# Patient Record
Sex: FEMALE
[De-identification: names, ages, dates, MRNs, and addresses within clinical notes are randomized per-mention and may not be internally consistent; named-entity substitution may affect disease eponyms.]

---

## 2019-11-18 PROBLEM — Z00.00 ENCOUNTER FOR PREVENTIVE HEALTH EXAMINATION: Status: ACTIVE | Noted: 2019-11-18

## 2022-08-04 ENCOUNTER — NON-APPOINTMENT (OUTPATIENT)
Age: 77
End: 2022-08-04

## 2023-02-10 ENCOUNTER — APPOINTMENT (OUTPATIENT)
Dept: ORTHOPEDIC SURGERY | Facility: CLINIC | Age: 78
End: 2023-02-10
Payer: OTHER MISCELLANEOUS

## 2023-02-10 VITALS — HEIGHT: 61 IN | BODY MASS INDEX: 26.43 KG/M2 | WEIGHT: 140 LBS

## 2023-02-10 DIAGNOSIS — Z78.9 OTHER SPECIFIED HEALTH STATUS: ICD-10-CM

## 2023-02-10 DIAGNOSIS — Z85.118 PERSONAL HISTORY OF OTHER MALIGNANT NEOPLASM OF BRONCHUS AND LUNG: ICD-10-CM

## 2023-02-10 DIAGNOSIS — Z86.79 PERSONAL HISTORY OF OTHER DISEASES OF THE CIRCULATORY SYSTEM: ICD-10-CM

## 2023-02-10 PROCEDURE — 99072 ADDL SUPL MATRL&STAF TM PHE: CPT

## 2023-02-10 PROCEDURE — 73030 X-RAY EXAM OF SHOULDER: CPT | Mod: RT

## 2023-02-10 PROCEDURE — 99203 OFFICE O/P NEW LOW 30 MIN: CPT

## 2023-02-10 RX ORDER — LOSARTAN POTASSIUM 100 MG/1
100 TABLET, FILM COATED ORAL
Refills: 0 | Status: ACTIVE | COMMUNITY

## 2023-02-10 RX ORDER — METOPROLOL SUCCINATE 100 MG/1
100 TABLET, EXTENDED RELEASE ORAL
Refills: 0 | Status: ACTIVE | COMMUNITY

## 2023-02-10 RX ORDER — EZETIMIBE 10 MG/1
10 TABLET ORAL
Refills: 0 | Status: ACTIVE | COMMUNITY

## 2023-02-10 NOTE — WORK
[Torn Ligament/Tendon/Muscle] : torn ligament, tendon or muscle [Was the competent medical cause of the injury] : was the competent medical cause of the injury [Are consistent with the injury] : are consistent with the injury [Consistent with my objective findings] : consistent with my objective findings [Mild Partial] : mild partial [Does not reveal pre-existing condition(s) that may affect treatment/prognosis] : does not reveal pre-existing condition(s) that may affect treatment/prognosis [Can return to work without limitations on ______] : can return to work without limitations on [unfilled] [Patient] : patient [No Rx restrictions] : No Rx restrictions. [I provided the services listed above] :  I provided the services listed above. [FreeTextEntry1] : 75% [Heavy Work:] : Heavy Work: Exerting 50 to 100 pounds of force occasionally, and/or 25 to 50 pounds of force frequently, and/or 10 to 20 pounds of force constantly to move objects. Physical demand requirements are in excess of those for Medium Work

## 2023-02-10 NOTE — DISCUSSION/SUMMARY
[de-identified] : (Assessment)\par \par History, clinical examination and imaging were most consistent with:\par -right rotator cuff tear\par \par The diagnosis was explained in detail. The potential non-surgical and surgical treatments were reviewed. The relative risks and benefits of each option were considered relative to the patient’s age, activity level, medical history, symptom severity and previously attempted treatments. \par \par -Non-surgical treatment was selected. \par -Patient will begin with PT and OTC medication. 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 She defers advanced imaging at this time in favor of conservative treatment.\par -Injection is discussed and deferred at this time.\par -Patient's work status will remain full duty as she can complete her duties with her current shoulder function. \par -If she fails to improve we would consider cortisone injection at her next visit. \par \par (Plan)\par \par -Activity modification\par -Physical therapy: script provided\par -Ibuprofen: twice daily with food as needed for pain, GI precautions discussed\par -Follow up: 6 weeks if failing to improve\par \par \par (MDM) \par \par Problem Complexity\par -Moderate: chronic illness with exacerbation\par \par Risk\par -Low: physical therapy\par -Low: over the counter medication\par \par Patient has not been seen by another provider in my practice within the past 2 years who specializes in orthopedic sports medicine, shoulder or elbow surgery. \par \par The patient was advised to consult with their primary medical provider prior to initiation of any new medications to reduce the risk of adverse effects specific to their long-term home medications and medical history. The risk of gastrointestinal irritation and kidney injury specific to long-term NSAID use was discussed.   \par \par

## 2023-02-10 NOTE — HISTORY OF PRESENT ILLNESS
[de-identified] : (New Visit)\par \par Patient Details\par -Age: 78\par -Occupation:   at [employer]\par -Worker’s compensation claim: yes 12/20/2022\par -No-fault claim: no\par -School injury claim: no  \par \par \par Symptom Details \par -Body part: RT shoulder \par -Duration of symptoms: 12/20/22\par -How symptoms began:  Slipped and fell walking into the kitchen at work, landing on her RT shoulder. she has continued to work but wanted an evaluation. worse with lifting. patient denies shoulder pain prior to the injury\par -Location of pain: lateral\par -Quality of pain: dull\par -Pain score at rest: 2\par -Pain score with activity: 5\par -Swelling: no\par -Stiffness: no\par -Radicular symptoms (numbness or radiating pain down extremity): no\par \par \par Treatment Details\par -Activity modification: lifting less\par -Medication: advil prn\par -Physical therapy: no \par -Home exercise program: no \par -Injection: no\par -MRI: no\par \par

## 2023-02-10 NOTE — IMAGING
[de-identified] : (Shoulder Examination)\par \par Laterality\par -Right\par \par General\par -In no acute distress: yes\par -Alert and oriented to person, place and time: yes\par -Lymphadenopathy: no\par -Peripheral edema: no\par -Sensation grossly intact to light touch: yes\par -Capillary refill less than 2 seconds: yes \par \par Inspection\par -Skin intact: yes\par -Swelling: no\par -Atrophy: no\par -Scapular winging: no\par -AC deformity: no\par -Biceps deformity: no\par \par Tenderness to Palpation\par -Bicipital groove: no \par -AC joint: no\par -Scapulothoracic: no\par -Cervical paraspinal: no\par \par Range of Motion\par -Active forward elevation: 150\par -Passive forward elevation: 170\par -External rotation at the side: 70\par -Internal rotation behind the back: T7 \par -Cervical spine: normal\par \par Strength \par -Forward elevation: 5-\par -External rotation at the side: 5-\par -Internal rotation at the side: 5\par -Deltoid: 5\par \par Special Tests\par -Burger: positive \par -O’Juan Daniel’s: positive \par -Speed’s: negative\par -Cross body adduction: negative\par -Apprehension and relocation: negative\par -Sulcus sign: negative\par -Belly press: negative\par -Spurling’s: negative\par \par  [Right] : right shoulder [There are no fractures, subluxations or dislocations. No significant abnormalities are seen] : There are no fractures, subluxations or dislocations. No significant abnormalities are seen

## 2023-03-24 ENCOUNTER — APPOINTMENT (OUTPATIENT)
Dept: ORTHOPEDIC SURGERY | Facility: CLINIC | Age: 78
End: 2023-03-24

## 2023-03-31 ENCOUNTER — APPOINTMENT (OUTPATIENT)
Dept: ORTHOPEDIC SURGERY | Facility: CLINIC | Age: 78
End: 2023-03-31
Payer: OTHER MISCELLANEOUS

## 2023-03-31 PROCEDURE — 99213 OFFICE O/P EST LOW 20 MIN: CPT

## 2023-03-31 NOTE — WORK
[Torn Ligament/Tendon/Muscle] : torn ligament, tendon or muscle [Was the competent medical cause of the injury] : was the competent medical cause of the injury [Are consistent with the injury] : are consistent with the injury [Consistent with my objective findings] : consistent with my objective findings [Mild Partial] : mild partial [Does not reveal pre-existing condition(s) that may affect treatment/prognosis] : does not reveal pre-existing condition(s) that may affect treatment/prognosis [Patient] : patient [No Rx restrictions] : No Rx restrictions. [I provided the services listed above] :  I provided the services listed above. [Heavy Work:] : Heavy Work: Exerting 50 to 100 pounds of force occasionally, and/or 25 to 50 pounds of force frequently, and/or 10 to 20 pounds of force constantly to move objects. Physical demand requirements are in excess of those for Medium Work [Can return to work without limitations on ______] : can return to work without limitations on [unfilled] [N/A] : : Not Applicable [FreeTextEntry1] : 75%

## 2023-03-31 NOTE — DISCUSSION/SUMMARY
[de-identified] : (Assessment and Plan)\par \par History, clinical examination and imaging were most consistent with:\par -right shoulder rotator cuff tear\par \par The diagnosis was explained in detail. The potential non-surgical and surgical treatments were reviewed. The relative risks and benefits of each option were considered relative to the patient’s age, activity level, medical history, symptom severity and previously attempted treatments. \par \par -Non-surgical treatment was selected. \par -Patient will proceed with formal PT when approved by insurance. She will continue HEP in the interim. \par -Cortisone injection is discussed and deferred by the patient at this time. \par -Over the counter acetaminophen as needed for pain. \par -The added clinical utility of an MRI was discussed. The patient deferred further diagnostic testing at this time.\par -Work status will remain full duty. \par -Follow up in 6 to 8 weeks if symptoms persist or fail to improve. Consider injection and additional PT versus MMI at that time. \par \par \par (MDM) \par \par Problem Complexity\par -Moderate: chronic illness with exacerbation\par \par Risk\par -Low: over the counter medication\par \par The patient was advised to consult with their primary medical provider prior to initiation of any new medications to reduce the risk of adverse effects specific to their long-term home medications and medical history. The risk of gastrointestinal irritation and kidney injury specific to long-term NSAID use was discussed.   \par \par

## 2023-03-31 NOTE — IMAGING
[de-identified] : (Shoulder Examination)\par \par Laterality\par -Right\par \par General\par -In no acute distress: yes\par -Alert and oriented to person, place and time: yes\par -Lymphadenopathy: no\par -Peripheral edema: no\par -Sensation grossly intact to light touch: yes\par -Capillary refill less than 2 seconds: yes \par \par Inspection\par -Skin intact: yes\par -Swelling: no\par -Atrophy: no\par -Scapular winging: no\par -AC deformity: no\par -Biceps deformity: no\par \par Tenderness to Palpation\par -Bicipital groove: no \par -AC joint: no\par -Scapulothoracic: no\par -Cervical paraspinal: no\par \par Range of Motion\par -Active forward elevation: 165\par -Passive forward elevation: 170\par -External rotation at the side: 70\par -Internal rotation behind the back: T7 \par -Cervical spine: normal\par \par Strength \par -Forward elevation: 5-\par -External rotation at the side: 5\par -Internal rotation at the side: 5\par -Deltoid: 5\par \par Special Tests\par -Burger: positive \par -O’Juan Daniel’s: positive \par -Speed’s: negative\par -Cross body adduction: negative\par -Apprehension and relocation: negative\par -Sulcus sign: negative\par -Belly press: negative\par -Spurling’s: negative\par \par

## 2023-03-31 NOTE — HISTORY OF PRESENT ILLNESS
[de-identified] : (Follow-Up Visit) \par \par Symptom Details\par -Body part: RT shoulder \par -Pain score at rest: 7/10\par -Pain score with activity: 7/10\par -Improvement since last visit: HEP has helped , she had looked up exercises \par \par Interval Treatment Details\par -Activity modification: no\par -Medication: Tylenol prn \par -Physical therapy: has not been able to start yet \par -Home exercise program: yes \par -Injection: no \par -MRI: no\par \par (History of Present Illness)\par \par Body part causing symptoms is the RT shoulder\par Pain score at rest is 3/10\par Pain score during activity is 7/10 \par Treatments since last visit include HEP, tylenol\par She has not been able to start PT due to WC auth \par Compared to last visit, symptoms are improved\par She continues to work full duty

## 2023-05-12 ENCOUNTER — APPOINTMENT (OUTPATIENT)
Dept: ORTHOPEDIC SURGERY | Facility: CLINIC | Age: 78
End: 2023-05-12
Payer: OTHER MISCELLANEOUS

## 2023-05-12 DIAGNOSIS — M75.111 INCOMPLETE ROTATOR CUFF TEAR OR RUPTURE OF RIGHT SHOULDER, NOT SPECIFIED AS TRAUMATIC: ICD-10-CM

## 2023-05-12 PROCEDURE — 99213 OFFICE O/P EST LOW 20 MIN: CPT

## 2023-05-12 NOTE — IMAGING
[de-identified] : (Shoulder Examination)\par \par Laterality\par -Right\par \par General\par -In no acute distress: yes\par -Alert and oriented to person, place and time: yes\par -Lymphadenopathy: no\par -Peripheral edema: no\par -Sensation grossly intact to light touch: yes\par -Capillary refill less than 2 seconds: yes \par \par Inspection\par -Skin intact: yes\par -Swelling: no\par -Atrophy: no\par -Scapular winging: no\par -AC deformity: no\par -Biceps deformity: no\par \par Tenderness to Palpation\par -Bicipital groove: no \par -AC joint: no\par -Scapulothoracic: no\par -Cervical paraspinal: no\par \par Range of Motion\par -Active forward elevation: 165\par -Passive forward elevation: 170\par -External rotation at the side: 70\par -Internal rotation behind the back: T7 \par -Cervical spine: normal\par \par Strength \par -Forward elevation: 5-\par -External rotation at the side: 5\par -Internal rotation at the side: 5\par -Deltoid: 5\par \par Special Tests\par -Burger: positive \par -O’Juan Daniel’s: positive \par -Speed’s: negative\par -Cross body adduction: negative\par -Apprehension and relocation: negative\par -Sulcus sign: negative\par -Belly press: negative\par -Spurling’s: negative\par \par

## 2023-05-12 NOTE — HISTORY OF PRESENT ILLNESS
[de-identified] : (History of Present Illness)\par \par Body part causing symptoms is the RT shoulder\par Pain score at rest is 3/10\par Pain score during activity is 5/10 \par Treatments since last visit include PT and tylenol\par Compared to last visit, symptoms are improved\par She continues to work full duty

## 2023-05-12 NOTE — WORK
[Torn Ligament/Tendon/Muscle] : torn ligament, tendon or muscle [Was the competent medical cause of the injury] : was the competent medical cause of the injury [Are consistent with the injury] : are consistent with the injury [Consistent with my objective findings] : consistent with my objective findings [Mild Partial] : mild partial [Does not reveal pre-existing condition(s) that may affect treatment/prognosis] : does not reveal pre-existing condition(s) that may affect treatment/prognosis [N/A] : : Not Applicable [Patient] : patient [No Rx restrictions] : No Rx restrictions. [I provided the services listed above] :  I provided the services listed above. [Heavy Work:] : Heavy Work: Exerting 50 to 100 pounds of force occasionally, and/or 25 to 50 pounds of force frequently, and/or 10 to 20 pounds of force constantly to move objects. Physical demand requirements are in excess of those for Medium Work [Can return to work without limitations on ______] : can return to work without limitations on [unfilled] [FreeTextEntry1] : 75%

## 2023-05-12 NOTE — DISCUSSION/SUMMARY
[de-identified] : (Assessment and Plan)\par \par History, clinical examination and imaging were most consistent with:\par -right shoulder rotator cuff tear\par \par The diagnosis was explained in detail. The potential non-surgical and surgical treatments were reviewed. The relative risks and benefits of each option were considered relative to the patient’s age, activity level, medical history, symptom severity and previously attempted treatments. \par \par -Non-surgical treatment was selected. \par -Patient will continue with PT. \par -Cortisone injection is again discussed and deferred by the patient at this time. \par -Over the counter acetaminophen as needed for pain. \par -The added clinical utility of an MRI was discussed. The patient deferred further diagnostic testing at this time.\par -Work status will remain full duty. \par -Follow up in 6 to 8 weeks if symptoms persist or fail to improve. Consider injection versus MMI at that time. \par \par \par (MDM) \par \par Problem Complexity\par -Moderate: chronic illness with exacerbation\par \par Risk\par -Low: over the counter medication\par \par The patient was advised to consult with their primary medical provider prior to initiation of any new medications to reduce the risk of adverse effects specific to their long-term home medications and medical history. The risk of gastrointestinal irritation and kidney injury specific to long-term NSAID use was discussed.   \par \par

## 2023-05-24 ENCOUNTER — APPOINTMENT (OUTPATIENT)
Dept: ORTHOPEDIC SURGERY | Facility: CLINIC | Age: 78
End: 2023-05-24

## 2023-07-12 ENCOUNTER — APPOINTMENT (OUTPATIENT)
Dept: ORTHOPEDIC SURGERY | Facility: CLINIC | Age: 78
End: 2023-07-12